# Patient Record
Sex: FEMALE | HISPANIC OR LATINO | Employment: PART TIME | URBAN - METROPOLITAN AREA
[De-identification: names, ages, dates, MRNs, and addresses within clinical notes are randomized per-mention and may not be internally consistent; named-entity substitution may affect disease eponyms.]

---

## 2019-04-27 ENCOUNTER — APPOINTMENT (OUTPATIENT)
Dept: ULTRASOUND IMAGING | Facility: CLINIC | Age: 39
End: 2019-04-27
Attending: PHYSICIAN ASSISTANT
Payer: COMMERCIAL

## 2019-04-27 ENCOUNTER — HOSPITAL ENCOUNTER (OUTPATIENT)
Facility: CLINIC | Age: 39
Setting detail: OBSERVATION
Discharge: HOME | End: 2019-04-28
Attending: NURSE PRACTITIONER | Admitting: INTERNAL MEDICINE
Payer: COMMERCIAL

## 2019-04-27 DIAGNOSIS — R11.14 BILIOUS VOMITING WITH NAUSEA: ICD-10-CM

## 2019-04-27 DIAGNOSIS — K80.13 CALCULUS OF GALLBLADDER WITH ACUTE ON CHRONIC CHOLECYSTITIS WITH OBSTRUCTION: Primary | ICD-10-CM

## 2019-04-27 DIAGNOSIS — K80.71: ICD-10-CM

## 2019-04-27 LAB
ALBUMIN SERPL-MCNC: 3.9 G/DL (ref 3.4–5)
ALP SERPL-CCNC: 96 U/L (ref 40–150)
ALT SERPL W P-5'-P-CCNC: 15 U/L (ref 0–50)
ANION GAP SERPL CALCULATED.3IONS-SCNC: 6 MMOL/L (ref 3–14)
AST SERPL W P-5'-P-CCNC: 10 U/L (ref 0–45)
B-HCG FREE SERPL-ACNC: <5 IU/L
BASOPHILS # BLD AUTO: 0.1 10E9/L (ref 0–0.2)
BASOPHILS NFR BLD AUTO: 0.8 %
BILIRUB SERPL-MCNC: 0.6 MG/DL (ref 0.2–1.3)
BUN SERPL-MCNC: 15 MG/DL (ref 7–30)
CALCIUM SERPL-MCNC: 9.2 MG/DL (ref 8.5–10.1)
CHLORIDE SERPL-SCNC: 106 MMOL/L (ref 94–109)
CO2 SERPL-SCNC: 27 MMOL/L (ref 20–32)
CREAT SERPL-MCNC: 0.51 MG/DL (ref 0.52–1.04)
DIFFERENTIAL METHOD BLD: ABNORMAL
EOSINOPHIL # BLD AUTO: 0.1 10E9/L (ref 0–0.7)
EOSINOPHIL NFR BLD AUTO: 1.6 %
ERYTHROCYTE [DISTWIDTH] IN BLOOD BY AUTOMATED COUNT: 14.9 % (ref 10–15)
GFR SERPL CREATININE-BSD FRML MDRD: >90 ML/MIN/{1.73_M2}
GLUCOSE SERPL-MCNC: 194 MG/DL (ref 70–99)
HCT VFR BLD AUTO: 35 % (ref 35–47)
HGB BLD-MCNC: 11.3 G/DL (ref 11.7–15.7)
IMM GRANULOCYTES # BLD: 0 10E9/L (ref 0–0.4)
IMM GRANULOCYTES NFR BLD: 0.3 %
LIPASE SERPL-CCNC: 209 U/L (ref 73–393)
LYMPHOCYTES # BLD AUTO: 1.4 10E9/L (ref 0.8–5.3)
LYMPHOCYTES NFR BLD AUTO: 18.6 %
MCH RBC QN AUTO: 22.8 PG (ref 26.5–33)
MCHC RBC AUTO-ENTMCNC: 32.3 G/DL (ref 31.5–36.5)
MCV RBC AUTO: 71 FL (ref 78–100)
MICROCYTES BLD QL SMEAR: PRESENT
MONOCYTES # BLD AUTO: 0.4 10E9/L (ref 0–1.3)
MONOCYTES NFR BLD AUTO: 5.8 %
NEUTROPHILS # BLD AUTO: 5.4 10E9/L (ref 1.6–8.3)
NEUTROPHILS NFR BLD AUTO: 72.9 %
NRBC # BLD AUTO: 0 10*3/UL
NRBC BLD AUTO-RTO: 0 /100
PLATELET # BLD AUTO: 114 10E9/L (ref 150–450)
PLATELET # BLD EST: ABNORMAL 10*3/UL
POTASSIUM SERPL-SCNC: 3.5 MMOL/L (ref 3.4–5.3)
PROT SERPL-MCNC: 8.2 G/DL (ref 6.8–8.8)
RBC # BLD AUTO: 4.95 10E12/L (ref 3.8–5.2)
SODIUM SERPL-SCNC: 139 MMOL/L (ref 133–144)
WBC # BLD AUTO: 7.4 10E9/L (ref 4–11)

## 2019-04-27 PROCEDURE — 96375 TX/PRO/DX INJ NEW DRUG ADDON: CPT

## 2019-04-27 PROCEDURE — 96376 TX/PRO/DX INJ SAME DRUG ADON: CPT | Mod: 59

## 2019-04-27 PROCEDURE — 83690 ASSAY OF LIPASE: CPT | Performed by: PHYSICIAN ASSISTANT

## 2019-04-27 PROCEDURE — 96374 THER/PROPH/DIAG INJ IV PUSH: CPT | Mod: 59

## 2019-04-27 PROCEDURE — 25000128 H RX IP 250 OP 636: Performed by: PHYSICIAN ASSISTANT

## 2019-04-27 PROCEDURE — 80053 COMPREHEN METABOLIC PANEL: CPT | Performed by: PHYSICIAN ASSISTANT

## 2019-04-27 PROCEDURE — 25000131 ZZH RX MED GY IP 250 OP 636 PS 637: Performed by: NURSE PRACTITIONER

## 2019-04-27 PROCEDURE — 85025 COMPLETE CBC W/AUTO DIFF WBC: CPT | Performed by: PHYSICIAN ASSISTANT

## 2019-04-27 PROCEDURE — 99285 EMERGENCY DEPT VISIT HI MDM: CPT | Mod: 25

## 2019-04-27 PROCEDURE — 84702 CHORIONIC GONADOTROPIN TEST: CPT

## 2019-04-27 PROCEDURE — 76705 ECHO EXAM OF ABDOMEN: CPT

## 2019-04-27 RX ORDER — ONDANSETRON 4 MG/1
4 TABLET, ORALLY DISINTEGRATING ORAL
Status: COMPLETED | OUTPATIENT
Start: 2019-04-27 | End: 2019-04-27

## 2019-04-27 RX ORDER — PROMETHAZINE HYDROCHLORIDE 25 MG/ML
25 INJECTION, SOLUTION INTRAMUSCULAR; INTRAVENOUS ONCE
Status: DISCONTINUED | OUTPATIENT
Start: 2019-04-27 | End: 2019-04-27 | Stop reason: CLARIF

## 2019-04-27 RX ORDER — ONDANSETRON 2 MG/ML
4 INJECTION INTRAMUSCULAR; INTRAVENOUS EVERY 30 MIN PRN
Status: DISCONTINUED | OUTPATIENT
Start: 2019-04-27 | End: 2019-04-28

## 2019-04-27 RX ORDER — HYDROMORPHONE HYDROCHLORIDE 1 MG/ML
0.5 INJECTION, SOLUTION INTRAMUSCULAR; INTRAVENOUS; SUBCUTANEOUS
Status: COMPLETED | OUTPATIENT
Start: 2019-04-27 | End: 2019-04-27

## 2019-04-27 RX ADMIN — Medication 0.5 MG: at 22:59

## 2019-04-27 RX ADMIN — ONDANSETRON 4 MG: 2 INJECTION INTRAMUSCULAR; INTRAVENOUS at 22:59

## 2019-04-27 RX ADMIN — ONDANSETRON 4 MG: 2 INJECTION INTRAMUSCULAR; INTRAVENOUS at 23:42

## 2019-04-27 RX ADMIN — Medication 0.5 MG: at 23:42

## 2019-04-27 RX ADMIN — ONDANSETRON 4 MG: 4 TABLET, ORALLY DISINTEGRATING ORAL at 22:05

## 2019-04-27 ASSESSMENT — ENCOUNTER SYMPTOMS
NAUSEA: 1
VOMITING: 1
CHILLS: 0
FEVER: 0
ABDOMINAL PAIN: 1
SHORTNESS OF BREATH: 0
APPETITE CHANGE: 1
COUGH: 0
BLOOD IN STOOL: 0
DIARRHEA: 0

## 2019-04-28 ENCOUNTER — APPOINTMENT (OUTPATIENT)
Dept: CT IMAGING | Facility: CLINIC | Age: 39
End: 2019-04-28
Attending: PHYSICIAN ASSISTANT
Payer: COMMERCIAL

## 2019-04-28 ENCOUNTER — ANESTHESIA EVENT (OUTPATIENT)
Dept: SURGERY | Facility: CLINIC | Age: 39
End: 2019-04-28
Payer: COMMERCIAL

## 2019-04-28 ENCOUNTER — ANESTHESIA (OUTPATIENT)
Dept: SURGERY | Facility: CLINIC | Age: 39
End: 2019-04-28
Payer: COMMERCIAL

## 2019-04-28 VITALS
SYSTOLIC BLOOD PRESSURE: 104 MMHG | HEART RATE: 53 BPM | DIASTOLIC BLOOD PRESSURE: 74 MMHG | RESPIRATION RATE: 14 BRPM | OXYGEN SATURATION: 100 % | TEMPERATURE: 98.3 F

## 2019-04-28 PROBLEM — K80.21 GALLSTONES WITH OBSTRUCTION OF GALLBLADDER: Status: ACTIVE | Noted: 2019-04-28

## 2019-04-28 LAB
GLUCOSE BLDC GLUCOMTR-MCNC: 159 MG/DL (ref 70–99)
GLUCOSE BLDC GLUCOMTR-MCNC: 200 MG/DL (ref 70–99)

## 2019-04-28 PROCEDURE — 74177 CT ABD & PELVIS W/CONTRAST: CPT

## 2019-04-28 PROCEDURE — 25000128 H RX IP 250 OP 636: Performed by: SURGERY

## 2019-04-28 PROCEDURE — 25800030 ZZH RX IP 258 OP 636: Performed by: ANESTHESIOLOGY

## 2019-04-28 PROCEDURE — 36000058 ZZH SURGERY LEVEL 3 EA 15 ADDTL MIN: Performed by: SURGERY

## 2019-04-28 PROCEDURE — 40000306 ZZH STATISTIC PRE PROC ASSESS II: Performed by: SURGERY

## 2019-04-28 PROCEDURE — 25000128 H RX IP 250 OP 636: Performed by: INTERNAL MEDICINE

## 2019-04-28 PROCEDURE — 27210794 ZZH OR GENERAL SUPPLY STERILE: Performed by: SURGERY

## 2019-04-28 PROCEDURE — 25000128 H RX IP 250 OP 636: Performed by: NURSE ANESTHETIST, CERTIFIED REGISTERED

## 2019-04-28 PROCEDURE — 99207 ZZC CDG-CODE CATEGORY CHANGED: CPT | Performed by: INTERNAL MEDICINE

## 2019-04-28 PROCEDURE — 88304 TISSUE EXAM BY PATHOLOGIST: CPT | Performed by: SURGERY

## 2019-04-28 PROCEDURE — G0378 HOSPITAL OBSERVATION PER HR: HCPCS

## 2019-04-28 PROCEDURE — 71000012 ZZH RECOVERY PHASE 1 LEVEL 1 FIRST HR: Performed by: SURGERY

## 2019-04-28 PROCEDURE — 36000056 ZZH SURGERY LEVEL 3 1ST 30 MIN: Performed by: SURGERY

## 2019-04-28 PROCEDURE — 47562 LAPAROSCOPIC CHOLECYSTECTOMY: CPT | Mod: AS | Performed by: PHYSICIAN ASSISTANT

## 2019-04-28 PROCEDURE — 71000013 ZZH RECOVERY PHASE 1 LEVEL 1 EA ADDTL HR: Performed by: SURGERY

## 2019-04-28 PROCEDURE — 99204 OFFICE O/P NEW MOD 45 MIN: CPT | Mod: 57 | Performed by: SURGERY

## 2019-04-28 PROCEDURE — 25000132 ZZH RX MED GY IP 250 OP 250 PS 637: Performed by: ANESTHESIOLOGY

## 2019-04-28 PROCEDURE — 99235 HOSP IP/OBS SAME DATE MOD 70: CPT | Performed by: INTERNAL MEDICINE

## 2019-04-28 PROCEDURE — 71000027 ZZH RECOVERY PHASE 2 EACH 15 MINS: Performed by: SURGERY

## 2019-04-28 PROCEDURE — 00000146 ZZHCL STATISTIC GLUCOSE BY METER IP

## 2019-04-28 PROCEDURE — 47562 LAPAROSCOPIC CHOLECYSTECTOMY: CPT | Performed by: SURGERY

## 2019-04-28 PROCEDURE — 96375 TX/PRO/DX INJ NEW DRUG ADDON: CPT

## 2019-04-28 PROCEDURE — 37000008 ZZH ANESTHESIA TECHNICAL FEE, 1ST 30 MIN: Performed by: SURGERY

## 2019-04-28 PROCEDURE — 37000009 ZZH ANESTHESIA TECHNICAL FEE, EACH ADDTL 15 MIN: Performed by: SURGERY

## 2019-04-28 PROCEDURE — 25800030 ZZH RX IP 258 OP 636: Performed by: NURSE PRACTITIONER

## 2019-04-28 PROCEDURE — 25000125 ZZHC RX 250: Performed by: NURSE ANESTHETIST, CERTIFIED REGISTERED

## 2019-04-28 PROCEDURE — 25000128 H RX IP 250 OP 636: Performed by: NURSE PRACTITIONER

## 2019-04-28 PROCEDURE — 25000132 ZZH RX MED GY IP 250 OP 250 PS 637: Performed by: INTERNAL MEDICINE

## 2019-04-28 RX ORDER — NICOTINE POLACRILEX 4 MG
15-30 LOZENGE BUCCAL
Status: DISCONTINUED | OUTPATIENT
Start: 2019-04-28 | End: 2019-04-28 | Stop reason: HOSPADM

## 2019-04-28 RX ORDER — FENTANYL CITRATE 50 UG/ML
25-50 INJECTION, SOLUTION INTRAMUSCULAR; INTRAVENOUS
Status: DISCONTINUED | OUTPATIENT
Start: 2019-04-28 | End: 2019-04-28 | Stop reason: HOSPADM

## 2019-04-28 RX ORDER — BUPIVACAINE HYDROCHLORIDE 2.5 MG/ML
INJECTION, SOLUTION EPIDURAL; INFILTRATION; INTRACAUDAL PRN
Status: DISCONTINUED | OUTPATIENT
Start: 2019-04-28 | End: 2019-04-28 | Stop reason: HOSPADM

## 2019-04-28 RX ORDER — MEPERIDINE HYDROCHLORIDE 50 MG/ML
12.5 INJECTION INTRAMUSCULAR; INTRAVENOUS; SUBCUTANEOUS
Status: DISCONTINUED | OUTPATIENT
Start: 2019-04-28 | End: 2019-04-28 | Stop reason: HOSPADM

## 2019-04-28 RX ORDER — ALBUTEROL SULFATE 0.83 MG/ML
2.5 SOLUTION RESPIRATORY (INHALATION) EVERY 4 HOURS PRN
Status: DISCONTINUED | OUTPATIENT
Start: 2019-04-28 | End: 2019-04-28 | Stop reason: HOSPADM

## 2019-04-28 RX ORDER — BISACODYL 10 MG
10 SUPPOSITORY, RECTAL RECTAL DAILY PRN
Status: DISCONTINUED | OUTPATIENT
Start: 2019-04-28 | End: 2019-04-28 | Stop reason: HOSPADM

## 2019-04-28 RX ORDER — HYDROMORPHONE HYDROCHLORIDE 1 MG/ML
.3-.5 INJECTION, SOLUTION INTRAMUSCULAR; INTRAVENOUS; SUBCUTANEOUS EVERY 10 MIN PRN
Status: DISCONTINUED | OUTPATIENT
Start: 2019-04-28 | End: 2019-04-28 | Stop reason: HOSPADM

## 2019-04-28 RX ORDER — FENTANYL CITRATE 50 UG/ML
INJECTION, SOLUTION INTRAMUSCULAR; INTRAVENOUS PRN
Status: DISCONTINUED | OUTPATIENT
Start: 2019-04-28 | End: 2019-04-28

## 2019-04-28 RX ORDER — HYDROCODONE BITARTRATE AND ACETAMINOPHEN 5; 325 MG/1; MG/1
1-2 TABLET ORAL EVERY 4 HOURS PRN
Qty: 20 TABLET | Refills: 0 | Status: SHIPPED | OUTPATIENT
Start: 2019-04-28 | End: 2019-05-09

## 2019-04-28 RX ORDER — SODIUM CHLORIDE, SODIUM LACTATE, POTASSIUM CHLORIDE, CALCIUM CHLORIDE 600; 310; 30; 20 MG/100ML; MG/100ML; MG/100ML; MG/100ML
INJECTION, SOLUTION INTRAVENOUS CONTINUOUS
Status: DISCONTINUED | OUTPATIENT
Start: 2019-04-28 | End: 2019-04-28 | Stop reason: HOSPADM

## 2019-04-28 RX ORDER — LIDOCAINE HYDROCHLORIDE 10 MG/ML
INJECTION, SOLUTION INFILTRATION; PERINEURAL PRN
Status: DISCONTINUED | OUTPATIENT
Start: 2019-04-28 | End: 2019-04-28

## 2019-04-28 RX ORDER — CEFAZOLIN SODIUM 2 G/100ML
2 INJECTION, SOLUTION INTRAVENOUS
Status: COMPLETED | OUTPATIENT
Start: 2019-04-28 | End: 2019-04-28

## 2019-04-28 RX ORDER — ACETAMINOPHEN 650 MG/1
650 SUPPOSITORY RECTAL EVERY 4 HOURS PRN
Status: DISCONTINUED | OUTPATIENT
Start: 2019-04-28 | End: 2019-04-28 | Stop reason: HOSPADM

## 2019-04-28 RX ORDER — ONDANSETRON 4 MG/1
4 TABLET, ORALLY DISINTEGRATING ORAL EVERY 30 MIN PRN
Status: DISCONTINUED | OUTPATIENT
Start: 2019-04-28 | End: 2019-04-28 | Stop reason: HOSPADM

## 2019-04-28 RX ORDER — LANOLIN ALCOHOL/MO/W.PET/CERES
3 CREAM (GRAM) TOPICAL
Status: DISCONTINUED | OUTPATIENT
Start: 2019-04-28 | End: 2019-04-28 | Stop reason: HOSPADM

## 2019-04-28 RX ORDER — ACETAMINOPHEN 325 MG/1
650 TABLET ORAL
Status: DISCONTINUED | OUTPATIENT
Start: 2019-04-28 | End: 2019-04-28 | Stop reason: HOSPADM

## 2019-04-28 RX ORDER — NALOXONE HYDROCHLORIDE 0.4 MG/ML
.1-.4 INJECTION, SOLUTION INTRAMUSCULAR; INTRAVENOUS; SUBCUTANEOUS
Status: DISCONTINUED | OUTPATIENT
Start: 2019-04-28 | End: 2019-04-28 | Stop reason: HOSPADM

## 2019-04-28 RX ORDER — BISACODYL 5 MG
5 TABLET, DELAYED RELEASE (ENTERIC COATED) ORAL DAILY PRN
Status: DISCONTINUED | OUTPATIENT
Start: 2019-04-28 | End: 2019-04-28 | Stop reason: HOSPADM

## 2019-04-28 RX ORDER — AMOXICILLIN 250 MG
2 CAPSULE ORAL 2 TIMES DAILY
Status: DISCONTINUED | OUTPATIENT
Start: 2019-04-28 | End: 2019-04-28 | Stop reason: HOSPADM

## 2019-04-28 RX ORDER — HYDRALAZINE HYDROCHLORIDE 20 MG/ML
2.5-5 INJECTION INTRAMUSCULAR; INTRAVENOUS EVERY 10 MIN PRN
Status: DISCONTINUED | OUTPATIENT
Start: 2019-04-28 | End: 2019-04-28 | Stop reason: HOSPADM

## 2019-04-28 RX ORDER — BISACODYL 5 MG
15 TABLET, DELAYED RELEASE (ENTERIC COATED) ORAL DAILY PRN
Status: DISCONTINUED | OUTPATIENT
Start: 2019-04-28 | End: 2019-04-28 | Stop reason: HOSPADM

## 2019-04-28 RX ORDER — ACETAMINOPHEN 325 MG/1
650 TABLET ORAL EVERY 4 HOURS PRN
Status: DISCONTINUED | OUTPATIENT
Start: 2019-04-28 | End: 2019-04-28 | Stop reason: HOSPADM

## 2019-04-28 RX ORDER — DEXTROSE MONOHYDRATE 25 G/50ML
25-50 INJECTION, SOLUTION INTRAVENOUS
Status: DISCONTINUED | OUTPATIENT
Start: 2019-04-28 | End: 2019-04-28 | Stop reason: HOSPADM

## 2019-04-28 RX ORDER — FENTANYL CITRATE 50 UG/ML
25-50 INJECTION, SOLUTION INTRAMUSCULAR; INTRAVENOUS EVERY 5 MIN PRN
Status: DISCONTINUED | OUTPATIENT
Start: 2019-04-28 | End: 2019-04-28 | Stop reason: HOSPADM

## 2019-04-28 RX ORDER — CEFAZOLIN SODIUM 1 G/3ML
1 INJECTION, POWDER, FOR SOLUTION INTRAMUSCULAR; INTRAVENOUS SEE ADMIN INSTRUCTIONS
Status: DISCONTINUED | OUTPATIENT
Start: 2019-04-28 | End: 2019-04-28 | Stop reason: HOSPADM

## 2019-04-28 RX ORDER — KETOROLAC TROMETHAMINE 30 MG/ML
30 INJECTION, SOLUTION INTRAMUSCULAR; INTRAVENOUS EVERY 6 HOURS PRN
Status: DISCONTINUED | OUTPATIENT
Start: 2019-04-28 | End: 2019-04-28 | Stop reason: HOSPADM

## 2019-04-28 RX ORDER — NEOSTIGMINE METHYLSULFATE 1 MG/ML
VIAL (ML) INJECTION PRN
Status: DISCONTINUED | OUTPATIENT
Start: 2019-04-28 | End: 2019-04-28

## 2019-04-28 RX ORDER — HYDROCODONE BITARTRATE AND ACETAMINOPHEN 5; 325 MG/1; MG/1
1 TABLET ORAL
Status: COMPLETED | OUTPATIENT
Start: 2019-04-28 | End: 2019-04-28

## 2019-04-28 RX ORDER — ONDANSETRON 2 MG/ML
INJECTION INTRAMUSCULAR; INTRAVENOUS PRN
Status: DISCONTINUED | OUTPATIENT
Start: 2019-04-28 | End: 2019-04-28

## 2019-04-28 RX ORDER — OXYCODONE HYDROCHLORIDE 5 MG/1
5 TABLET ORAL EVERY 4 HOURS PRN
Status: DISCONTINUED | OUTPATIENT
Start: 2019-04-28 | End: 2019-04-28 | Stop reason: HOSPADM

## 2019-04-28 RX ORDER — HYDROCODONE BITARTRATE AND ACETAMINOPHEN 5; 325 MG/1; MG/1
2 TABLET ORAL
Status: DISCONTINUED | OUTPATIENT
Start: 2019-04-28 | End: 2019-04-28 | Stop reason: HOSPADM

## 2019-04-28 RX ORDER — PROCHLORPERAZINE MALEATE 5 MG
10 TABLET ORAL EVERY 6 HOURS PRN
Status: DISCONTINUED | OUTPATIENT
Start: 2019-04-28 | End: 2019-04-28 | Stop reason: HOSPADM

## 2019-04-28 RX ORDER — ONDANSETRON 2 MG/ML
4 INJECTION INTRAMUSCULAR; INTRAVENOUS EVERY 6 HOURS PRN
Status: DISCONTINUED | OUTPATIENT
Start: 2019-04-28 | End: 2019-04-28 | Stop reason: HOSPADM

## 2019-04-28 RX ORDER — ONDANSETRON 4 MG/1
4 TABLET, ORALLY DISINTEGRATING ORAL EVERY 6 HOURS PRN
Status: DISCONTINUED | OUTPATIENT
Start: 2019-04-28 | End: 2019-04-28 | Stop reason: HOSPADM

## 2019-04-28 RX ORDER — IOPAMIDOL 755 MG/ML
500 INJECTION, SOLUTION INTRAVASCULAR ONCE
Status: COMPLETED | OUTPATIENT
Start: 2019-04-28 | End: 2019-04-28

## 2019-04-28 RX ORDER — BISACODYL 5 MG
10 TABLET, DELAYED RELEASE (ENTERIC COATED) ORAL DAILY PRN
Status: DISCONTINUED | OUTPATIENT
Start: 2019-04-28 | End: 2019-04-28 | Stop reason: HOSPADM

## 2019-04-28 RX ORDER — GLYCOPYRROLATE 0.2 MG/ML
INJECTION, SOLUTION INTRAMUSCULAR; INTRAVENOUS PRN
Status: DISCONTINUED | OUTPATIENT
Start: 2019-04-28 | End: 2019-04-28

## 2019-04-28 RX ORDER — PROCHLORPERAZINE 25 MG
25 SUPPOSITORY, RECTAL RECTAL EVERY 12 HOURS PRN
Status: DISCONTINUED | OUTPATIENT
Start: 2019-04-28 | End: 2019-04-28 | Stop reason: HOSPADM

## 2019-04-28 RX ORDER — METOPROLOL TARTRATE 1 MG/ML
1-2 INJECTION, SOLUTION INTRAVENOUS EVERY 5 MIN PRN
Status: DISCONTINUED | OUTPATIENT
Start: 2019-04-28 | End: 2019-04-28 | Stop reason: HOSPADM

## 2019-04-28 RX ORDER — PROPOFOL 10 MG/ML
INJECTION, EMULSION INTRAVENOUS PRN
Status: DISCONTINUED | OUTPATIENT
Start: 2019-04-28 | End: 2019-04-28

## 2019-04-28 RX ORDER — AMOXICILLIN 250 MG
1 CAPSULE ORAL 2 TIMES DAILY
Status: DISCONTINUED | OUTPATIENT
Start: 2019-04-28 | End: 2019-04-28 | Stop reason: HOSPADM

## 2019-04-28 RX ORDER — ONDANSETRON 2 MG/ML
4 INJECTION INTRAMUSCULAR; INTRAVENOUS EVERY 30 MIN PRN
Status: DISCONTINUED | OUTPATIENT
Start: 2019-04-28 | End: 2019-04-28 | Stop reason: HOSPADM

## 2019-04-28 RX ORDER — LIDOCAINE 40 MG/G
CREAM TOPICAL
Status: DISCONTINUED | OUTPATIENT
Start: 2019-04-28 | End: 2019-04-28 | Stop reason: HOSPADM

## 2019-04-28 RX ADMIN — FENTANYL CITRATE 100 MCG: 50 INJECTION, SOLUTION INTRAMUSCULAR; INTRAVENOUS at 10:51

## 2019-04-28 RX ADMIN — Medication 80 MG: at 10:51

## 2019-04-28 RX ADMIN — HYDROCODONE BITARTRATE AND ACETAMINOPHEN 1 TABLET: 5; 325 TABLET ORAL at 13:09

## 2019-04-28 RX ADMIN — FENTANYL CITRATE 150 MCG: 50 INJECTION, SOLUTION INTRAMUSCULAR; INTRAVENOUS at 10:54

## 2019-04-28 RX ADMIN — GLYCOPYRROLATE 0.6 MG: 0.2 INJECTION, SOLUTION INTRAMUSCULAR; INTRAVENOUS at 11:32

## 2019-04-28 RX ADMIN — IOPAMIDOL 86 ML: 755 INJECTION, SOLUTION INTRAVENOUS at 00:16

## 2019-04-28 RX ADMIN — CEFAZOLIN SODIUM 2 G: 2 INJECTION, SOLUTION INTRAVENOUS at 10:46

## 2019-04-28 RX ADMIN — Medication 4 MG: at 11:32

## 2019-04-28 RX ADMIN — PROPOFOL 150 MG: 10 INJECTION, EMULSION INTRAVENOUS at 10:51

## 2019-04-28 RX ADMIN — PROPOFOL 50 MG: 10 INJECTION, EMULSION INTRAVENOUS at 11:23

## 2019-04-28 RX ADMIN — SODIUM CHLORIDE 62 ML: 9 INJECTION, SOLUTION INTRAVENOUS at 00:16

## 2019-04-28 RX ADMIN — INSULIN HUMAN 2 UNITS: 100 INJECTION, SOLUTION PARENTERAL at 10:14

## 2019-04-28 RX ADMIN — SODIUM CHLORIDE, POTASSIUM CHLORIDE, SODIUM LACTATE AND CALCIUM CHLORIDE: 600; 310; 30; 20 INJECTION, SOLUTION INTRAVENOUS at 12:19

## 2019-04-28 RX ADMIN — ONDANSETRON 4 MG: 2 INJECTION INTRAMUSCULAR; INTRAVENOUS at 11:06

## 2019-04-28 RX ADMIN — HYDROMORPHONE HYDROCHLORIDE 1 MG: 1 INJECTION, SOLUTION INTRAMUSCULAR; INTRAVENOUS; SUBCUTANEOUS at 11:33

## 2019-04-28 RX ADMIN — SODIUM CHLORIDE, POTASSIUM CHLORIDE, SODIUM LACTATE AND CALCIUM CHLORIDE: 600; 310; 30; 20 INJECTION, SOLUTION INTRAVENOUS at 10:46

## 2019-04-28 RX ADMIN — LIDOCAINE HYDROCHLORIDE 50 MG: 10 INJECTION, SOLUTION INFILTRATION; PERINEURAL at 10:51

## 2019-04-28 RX ADMIN — PROCHLORPERAZINE EDISYLATE 10 MG: 5 INJECTION INTRAMUSCULAR; INTRAVENOUS at 01:46

## 2019-04-28 RX ADMIN — ROCURONIUM BROMIDE 30 MG: 10 INJECTION INTRAVENOUS at 10:54

## 2019-04-28 RX ADMIN — OXYCODONE HYDROCHLORIDE 5 MG: 5 TABLET ORAL at 06:52

## 2019-04-28 RX ADMIN — PROMETHAZINE HYDROCHLORIDE 25 MG: 25 INJECTION INTRAMUSCULAR; INTRAVENOUS at 00:06

## 2019-04-28 SDOH — HEALTH STABILITY: MENTAL HEALTH: HOW OFTEN DO YOU HAVE A DRINK CONTAINING ALCOHOL?: 2-3 TIMES A WEEK

## 2019-04-28 SDOH — HEALTH STABILITY: MENTAL HEALTH: HOW MANY STANDARD DRINKS CONTAINING ALCOHOL DO YOU HAVE ON A TYPICAL DAY?: 1 OR 2

## 2019-04-28 NOTE — ANESTHESIA CARE TRANSFER NOTE
Patient: Jacqueline Cifuentes    Procedure(s):  CHOLECYSTECTOMY, LAPAROSCOPIC    Diagnosis: update  Diagnosis Additional Information: No value filed.    Anesthesia Type:   General, ETT     Note:    Patient transferred to:PACU  Comments: Pt spont resps oral airway suctioned ETT removed to PACU VSS report to RNHandoff Report: Identifed the Patient, Identified the Reponsible Provider, Reviewed the pertinent medical history, Discussed the surgical course, Reviewed Intra-OP anesthesia mangement and issues during anesthesia, Set expectations for post-procedure period and Allowed opportunity for questions and acknowledgement of understanding      Vitals: (Last set prior to Anesthesia Care Transfer)    CRNA VITALS  4/28/2019 1114 - 4/28/2019 1148      4/28/2019             Pulse:  95    SpO2:  100 %                Electronically Signed By: NICK Sparrow CRNA  April 28, 2019  11:48 AM

## 2019-04-28 NOTE — CONSULTS
SURGICAL CONSULTATION   REQUESTING PHYSICIAN:  Dr Roxanne Lopez   HISTORY OF PRESENT ILLNESS:  I was asked by Dr. Lopez to see Jacqueline Cifuentes who is a 38 year-old female with a 16 hours history of abdominal pain which is located in the RUQ.    The pain does radiate to her back.  It does wake her from sleep.  It is associated with  anorexia, nausea, vomiting and chills. She does not have a history of fatty food intolerance.   She denies a history of jaundice or prior episodes of pancreatitis. Prior abdominal surgery includes tubal ligation.   PAST MEDICAL HISTORY:  has a past medical history of Cholelithiasis and Type 2 diabetes mellitus (H).  Smoking History:  has never smoked.    PAST SURGICAL HISTORY:    Past Surgical History:   Procedure Laterality Date     TUBAL LIGATION  2013     MEDICATIONS:   No current facility-administered medications on file prior to encounter.   No current outpatient medications on file prior to encounter.                                  ceFAZolin  1 g Intravenous See Admin Instructions     ceFAZolin  2 g Intravenous Pre-Op/Pre-procedure x 1 dose     [Auto Hold] senna-docusate  1 tablet Oral BID    Or     [Auto Hold] senna-docusate  2 tablet Oral BID     sodium chloride (PF)  3 mL Intracatheter Q8H     FAMILY HISTORY:  Breast cancer, diabetes and heart disease.  ALLERGIES: This patient is allergic to is allergic to latex.   REVIEW OF SYSTEMS: Negative except the HPI.   PHYSICAL EXAMINATION:   GENERAL: Pleasant, well-developed, well-nourished female, not ill-appearing, tired.   /78   Pulse 64   Temp 98.7  F (37.1  C) (Temporal)   Resp 16   SpO2 100%   HEENT: Normocephalic, atraumatic. No scleral icterus.   NECK/LYMPH: Supple.  No adenopathy.   LUNGS: Respirations unlabored.   CARDIOVASCULAR: Regular rhythm and rate, no murmurs.  ABDOMEN:  Abdomen is scaphoid. Tenderness, marked and involuntary guarding and  RUQ.  The gallbladder is palpable and tender. hypoactive bowel sounds.  No  hernia or scars noted.   EXTREMITIES: Without edema or deformity.  NEUROLOGIC: Alert and oriented, moves all extremities with good strength. Speech clear.   LABORATORY DATA: WBC- WBC   Date Value Ref Range Status   04/27/2019 7.4 4.0 - 11.0 10e9/L Final   , HgB- Hemoglobin   Date Value Ref Range Status   04/27/2019 11.3 (L) 11.7 - 15.7 g/dL Final     Liver function studies: Total Bilirubin 0.6, Alkaline Phosphatase 96, ALT 15, AST 10, Lipase 209.    IMAGING:  All imaging personally reviewed  All imaging studies reviewed by me.  CT scan of the abdomen:   Gallbladder: Large stone in the gallbladder neck  CT scan interpreted by radiologist     CT scan of the pelvis:   No masses, fluid collections, abcesses or significant abnormalities  CT scan interpreted by radiologist     Gallbladder ultrasound:   Common bile duct measures normal  Gallstones seen  Stone in the gallbladder neck observed       ASSESSMENT AND PLAN: Jacqueline Cifuentes  has Acute Cholecystitis..  I am recommending her for laparoscopic cholecystectomy.  Intraoperative cholangiograms are not indicated due to normal CBD and LFTs.   I discussed the risks of the procedure including incisional related complications like hernia and infection as well as the small chance for post-cholecystectomy diarrhea, or the need to convert to an open cholecystectomy.  We also discussed rare complications such as bile leak, bowel or bile duct injury.  Jacqueline is interested in proceeding with surgery in the next few hours.  ARA COTO MD     Please route or send letter to:  Primary Care Provider (PCP) and Referring Provider

## 2019-04-28 NOTE — H&P
History and Physical     Jacqueline Cifuentes MRN# [id number]   YOB: 1980 Age: 38 year old      Date of Admission:  4/27/2019    Primary care provider: Christine, Sheryl Lester          Assessment and Plan:   Summary of Stay: Jacqueline Cifuentes is a 38 year old female with a history of T2 dm, gallstones admitted on 4/27/2019 with RUQ abdominal pain 2/2 large gallstone stuck in the neck of the GB  Problem List:   1. Gallstone with GB obstruction:  Admit, keep NPO x meds/ice chips, pain and nausea control and surgical evaluation.  Will likely need surgery at some point in the near future.   2. T2dm:  Per care everywhere looks to be on just metformin.  Hold for now in light of GI process, If starts to trend over 300 will need ISS  DVT Prophylaxis: Ambulate every shift  Code Status: Full Code    Discussed with  and dtr, dtr provides interpretation               Chief Complaint:   Abdominal pain        History of Present Illness:    Jacqueline Cifuentes is a 38 year old Welsh speaking female with a history of T2 dm, gallstones by prior CT scanning who presents with abdominal pain.    She presented to the ER with complaints of n/v/abdominal pain that began abruptly at 5 pm 4/27/19.  She tried tums but had no relief.  No fevers or chills.  Ate lunch at a chinese buffet around noon today.     In the ER VS notable for hypertension in the setting of uncontrolled pain, she is Afeb, all other VSS.  Labs normal CMP x glucose is 200, lipase is wnl, HCG negative for pregnancy, CBC with normal WBC, mild anemia and thrombocytopenia.    AUS:  + 2.5 cm gallstone in the neck of the GB, CT A/P with same    She's been given 1 mg of hydromorphone and 12 mg of ondansetron in the ER but remains nauseated, she's mildly sedated as well    I am asked to admit her to the obs unit for pain control and surgical consultation       The history is obtained in discussion with the ER provider  Sagar Plummer PA-C as well as the patient with  excellent reliability          Past Medical History:     Past Medical History:   Diagnosis Date     Cholelithiasis      Type 2 diabetes mellitus (H)              Past Surgical History:     Past Surgical History:   Procedure Laterality Date     TUBAL LIGATION  2013             Social History:     Social History     Tobacco Use     Smoking status: Not on file     Smokeless tobacco: Never Used   Substance Use Topics     Alcohol use: Yes     Frequency: 2-3 times a week     Drinks per session: 1 or 2    with dtr          Family History:     Family History   Problem Relation Age of Onset     Breast Cancer Mother      Coronary Artery Disease Mother      Diabetes Father      Diabetes Sister             Allergies:     Allergies   Allergen Reactions     Latex              Medications:   Per care everywhere    Metformin 500 mg bid    Await formal med rec            Review of Systems:   A Comprehensive greater than 10 system review of systems was carried out.  Pertinent positives and negatives are noted above.  Otherwise negative for contributory information.           Physical Exam:   Blood pressure (!) 155/101, pulse 60, temperature 97.5  F (36.4  C), temperature source Temporal, resp. rate 26, SpO2 94 %.  Exam:    General:  Sedated, laying in be in fetal position.  NAD  HEENT:  Head nc/at looks stated age, neck is supple  Lungs: cta b nl effort   CV:  RRR no m/r/g no le edema  Abd:  Soft but has guarding in ruq and epigastric region   Neuro:  Cn 2-12 chou  Alert and oriented affect appropriate   Skin:  W/d no c/c               Data:          Lab Results   Component Value Date     04/27/2019    Lab Results   Component Value Date    CHLORIDE 106 04/27/2019    Lab Results   Component Value Date    BUN 15 04/27/2019      Lab Results   Component Value Date    POTASSIUM 3.5 04/27/2019    Lab Results   Component Value Date    CO2 27 04/27/2019    Lab Results   Component Value Date    CR 0.51 04/27/2019        Lab  Results   Component Value Date    WBC 7.4 04/27/2019    HGB 11.3 (L) 04/27/2019    HCT 35.0 04/27/2019    MCV 71 (L) 04/27/2019     (L) 04/27/2019     Lab Results   Component Value Date     (H) 04/27/2019     Lab Results   Component Value Date    AST 10 04/27/2019    ALT 15 04/27/2019    ALKPHOS 96 04/27/2019    BILITOTAL 0.6 04/27/2019            Imaging:     Recent Results (from the past 24 hour(s))   US Abdomen Limited (RUQ)    Narrative    US ABDOMEN LIMITED 4/27/2019 11:34 PM    HISTORY: Right upper quadrant tenderness.    COMPARISON: None.    FINDINGS:    Gallbladder: There is a 2.5 cm nonmobile stone in the gallbladder  neck. Gallbladder wall thickness is normal. No pericholecystic fluid.    Bile ducts:  CHD is normal diameter, measuring 0.3 cm.  No  intrahepatic biliary dilatation.    Liver:  Normal size and echogenicity.    Pancreas:  Normal where visualized.    Right kidney:  Normal size and echogenicity.    Proximal aorta and IVC are unremarkable.      Impression    IMPRESSION:  Nonmobile stone in the gallbladder neck. No specific  evidence of cholecystitis.       LETICIA PROCTOR MD   CT Abdomen Pelvis w Contrast    Narrative    CT ABDOMEN PELVIS W CONTRAST 4/28/2019 12:20 AM    HISTORY: Diffuse abdominal pain. Nausea and vomiting.    COMPARISON: None.    TECHNIQUE:  Abdomen and pelvis CT was performed following intravenous  administration of 86mL Isovue-370.  Radiation dose for this scan was  reduced using automated exposure control, adjustment of the mA and/or  kV according to patient size, or iterative reconstruction technique.    FINDINGS: Large 2.7 cm stone in the gallbladder neck. Gallbladder is  distended. No pericholecystic fluid. Liver, spleen, pancreas, adrenal  glands and kidneys appear normal.    Normal appendix. Normal caliber bowel. No free air. No free or  loculated fluid collection.    The uterus and adnexa appear normal for age. Urinary bladder is  partially distended with  normal wall thickness. No free fluid in the  pelvis.      Impression    IMPRESSION: Large gallstone in the gallbladder neck. Abdomen and  pelvis otherwise appear normal.    LETICIA PROCTOR MD

## 2019-04-28 NOTE — PLAN OF CARE
ROOM # 207  Living Situation (if not independent, order SW consult): Independent at home with   Facility name:  : [facility] ()   [phone]    Activity level at baseline: Ind  Activity level on admit: SBA for safety      Patient registered to observation; given Patient Bill of Rights; given the opportunity to ask questions about observation status and their plan of care.  Patient has been oriented to the observation room, bathroom and call light is in place.    Discussed discharge goals and expectations with patient/family.

## 2019-04-28 NOTE — ED NOTES
[facility]  ED Nurse Handoff Report    Jacquelien Cifuentes is a 38 year old female   ED Chief complaint: Abdominal Pain  . ED Diagnosis:   Final diagnoses:   Gallbladder/common duct stone, without infection, with obstruction     Allergies:   Allergies   Allergen Reactions     Latex        Code Status: Full Code  Activity level - Baseline/Home:  Independent. Activity Level - Current:   Independent. Lift room needed: No. Bariatric: No   Needed: Yes   Isolation: No. Infection: Not Applicable.     Vital Signs:   Vitals:    04/27/19 2345 04/28/19 0000 04/28/19 0015 04/28/19 0030   BP:       Pulse:       Resp:       Temp:       TempSrc:       SpO2: 98% 95% 98% 94%       Cardiac Rhythm:  ,      Pain level: 0-10 Pain Scale: 7  Patient confused: No. Patient Falls Risk: No.   Elimination Status: Unable to void   Patient Report - Initial Complaint: Patient comes in for evaluation of epigastric pain for a week which has become worse today, has also had nausea and vomiting. Patient states she has had intermittent fevers at home. Pain also radiates into back. ABCs intact.. Focused Assessment: Gastrointestinal - GI WDL: -WDL except; all  Abdominal Palpation: LUQ; RUQ  Nausea/Vomiting Signs/Symptoms: nausea continuous; emesis; yellow  Last Bowel Movement: 04/27/19  GI Signs/Symptoms: abdominal discomfort; abdominal pain; dry-heaving; indigestion; nausea   Gastrointestinal - LUQ Abdominal Palpation: tender  RUQ Abdominal Palpation: tender    Tests Performed: Lab, CT, US, UA needed. Abnormal Results:   Labs Ordered and Resulted from Time of ED Arrival Up to the Time of Departure from the ED   CBC WITH PLATELETS DIFFERENTIAL - Abnormal; Notable for the following components:       Result Value    Hemoglobin 11.3 (*)     MCV 71 (*)     MCH 22.8 (*)     Platelet Count 114 (*)     All other components within normal limits   COMPREHENSIVE METABOLIC PANEL - Abnormal; Notable for the following components:    Glucose 194  (*)     Creatinine 0.51 (*)     All other components within normal limits   LIPASE   URINE MACROSCOPIC WITH REFLEX TO MICRO   ISTAT HCG QUANTITATIVE PREGNANCY NURSING POCT   FREE WATER   ISTAT HCG QUANTITATIVE PREGNANCY POCT     CT Abdomen Pelvis w Contrast   Final Result   IMPRESSION: Large gallstone in the gallbladder neck. Abdomen and   pelvis otherwise appear normal.      LETICIA PROCTOR MD      US Abdomen Limited (RUQ)   Final Result   IMPRESSION:  Nonmobile stone in the gallbladder neck. No specific   evidence of cholecystitis.          LETICIA PROCTOR MD      .   Treatments provided: See MAR  Family Comments: Daughter and  present.  Daughter speaks some English.  OBS brochure/video discussed/provided to patient:  Yes  ED Medications:   Medications   ondansetron (ZOFRAN) injection 4 mg (4 mg Intravenous Given 4/27/19 2342)   ondansetron (ZOFRAN-ODT) ODT tab 4 mg (4 mg Oral Given 4/27/19 2205)   HYDROmorphone (PF) (DILAUDID) injection 0.5 mg (0.5 mg Intravenous Given 4/27/19 2259)   HYDROmorphone (PF) (DILAUDID) injection 0.5 mg (0.5 mg Intravenous Given 4/27/19 2342)   promethazine (PHENERGAN) 25 mg in sodium chloride 0.9 % 50 mL intermittent infusion (25 mg Intravenous Given 4/28/19 0006)   0.9% sodium chloride BOLUS (0 mLs Intravenous Stopped 4/28/19 0017)   iopamidol (ISOVUE-370) solution 500 mL (86 mLs Intravenous Given 4/28/19 0016)     Drips infusing:  No  For the majority of the shift, the patient's behavior Green. Interventions performed were none.     Severe Sepsis OR Septic Shock Diagnosis Present: No      ED Nurse Name/Phone Number: Yayo CONTRERASKonstantin Barreto,   12:40 AM  RECEIVING UNIT ED HANDOFF REVIEW    Above ED Nurse Handoff Report was reviewed: Yes  Reviewed by: Dagoberto Morejon on April 28, 2019 at 12:55 AM

## 2019-04-28 NOTE — ED TRIAGE NOTES
Patient comes in for evaluation of epigastric pain for a week which has become worse today, has also had nausea and vomiting. Patient states she has had intermittent fevers at home. Pain also radiates into back. ABCs intact.

## 2019-04-28 NOTE — ANESTHESIA POSTPROCEDURE EVALUATION
Patient: Jacqueline Cifuentes    Procedure(s):  CHOLECYSTECTOMY, LAPAROSCOPIC    Diagnosis:update  Diagnosis Additional Information: PREOPERATIVE DIAGNOSIS: Acute Cholecystitis.   POSTOPERATIVE DIAGNOSIS:Acute Cholecystitis. and Chronic Cholecysititis  PROCEDURE: Laparoscopic cholecystectomy.        Anesthesia Type:  General, ETT    Note:  Anesthesia Post Evaluation    Patient location during evaluation: PACU  Patient participation: Able to fully participate in evaluation  Level of consciousness: awake  Pain management: adequate  Airway patency: patent  Cardiovascular status: acceptable  Respiratory status: acceptable  Hydration status: acceptable  PONV: controlled     Anesthetic complications: None          Last vitals:  Vitals:    04/28/19 1300 04/28/19 1315 04/28/19 1400   BP: 135/76 140/76 104/74   Pulse:  53    Resp: 14     Temp:      SpO2: 100% 100% 100%         Electronically Signed By: Cj Chinchilla MD  April 28, 2019  2:37 PM

## 2019-04-28 NOTE — ED PROVIDER NOTES
Emergency Department Attending Supervision Note  4/27/2019  10:32 PM      I evaluated this patient in conjunction with Sagar FERNANDEZ    Briefly, the patient presented with uncontrolled nausea and vomiting and abdominal discomfort.  Symptoms started approximately 5:00 after dinner.  She has had no fevers.  This was sudden onset.  There is been no blood in vomit.  She has a history of tubal ligation.  No diarrhea.  No other family members are ill.  No other concerns or complaints at this time.      On my exam:  General: Alert, Moderate discomfort, well kept, retching on exam  Eyes: PERRL, conjunctivae pink no scleral icterus or conjunctival injection  ENT:   Moist mucus membranes, posterior oropharynx clear without erythema or exudates, No lymphadenopathy, Normal voice  Resp:  Lungs clear to auscultation bilaterally, no crackles/rubs/wheezes. Good air movement  CV:  Normal rate and rhythm, no murmurs/rubs/gallops  GI:  Abdomen soft and non-distended.  Normoactive BS.  Mid epigastric/right upper quadrant tenderness, also right lower quadrant tenderness, No guarding or rebound, No masses  Skin:  Warm, dry.  No rashes or petechiae  Musculoskeletal: No peripheral edema or calf tenderness, Normal gross ROM   Neuro: Alert and oriented to person/place/time, normal sensation  Psychiatric: Normal affect, cooperative, good eye contact    Recent Results (from the past 24 hour(s))   US Abdomen Limited (RUQ)    Narrative    US ABDOMEN LIMITED 4/27/2019 11:34 PM    HISTORY: Right upper quadrant tenderness.    COMPARISON: None.    FINDINGS:    Gallbladder: There is a 2.5 cm nonmobile stone in the gallbladder  neck. Gallbladder wall thickness is normal. No pericholecystic fluid.    Bile ducts:  CHD is normal diameter, measuring 0.3 cm.  No  intrahepatic biliary dilatation.    Liver:  Normal size and echogenicity.    Pancreas:  Normal where visualized.    Right kidney:  Normal size and echogenicity.    Proximal aorta and IVC are  unremarkable.      Impression    IMPRESSION:  Nonmobile stone in the gallbladder neck. No specific  evidence of cholecystitis.       LETICIA PROCTOR MD   CT Abdomen Pelvis w Contrast    Narrative    CT ABDOMEN PELVIS W CONTRAST 4/28/2019 12:20 AM    HISTORY: Diffuse abdominal pain. Nausea and vomiting.    COMPARISON: None.    TECHNIQUE:  Abdomen and pelvis CT was performed following intravenous  administration of 86mL Isovue-370.  Radiation dose for this scan was  reduced using automated exposure control, adjustment of the mA and/or  kV according to patient size, or iterative reconstruction technique.    FINDINGS: Large 2.7 cm stone in the gallbladder neck. Gallbladder is  distended. No pericholecystic fluid. Liver, spleen, pancreas, adrenal  glands and kidneys appear normal.    Normal appendix. Normal caliber bowel. No free air. No free or  loculated fluid collection.    The uterus and adnexa appear normal for age. Urinary bladder is  partially distended with normal wall thickness. No free fluid in the  pelvis.      Impression    IMPRESSION: Large gallstone in the gallbladder neck. Abdomen and  pelvis otherwise appear normal.    LETICIA PROCTOR MD     Recent Results (from the past 8 hour(s))   CBC with platelets differential    Collection Time: 04/27/19 10:27 PM   Result Value Ref Range    WBC 7.4 4.0 - 11.0 10e9/L    RBC Count 4.95 3.8 - 5.2 10e12/L    Hemoglobin 11.3 (L) 11.7 - 15.7 g/dL    Hematocrit 35.0 35.0 - 47.0 %    MCV 71 (L) 78 - 100 fl    MCH 22.8 (L) 26.5 - 33.0 pg    MCHC 32.3 31.5 - 36.5 g/dL    RDW 14.9 10.0 - 15.0 %    Platelet Count 114 (L) 150 - 450 10e9/L    Diff Method Automated Method     % Neutrophils 72.9 %    % Lymphocytes 18.6 %    % Monocytes 5.8 %    % Eosinophils 1.6 %    % Basophils 0.8 %    % Immature Granulocytes 0.3 %    Nucleated RBCs 0 0 /100    Absolute Neutrophil 5.4 1.6 - 8.3 10e9/L    Absolute Lymphocytes 1.4 0.8 - 5.3 10e9/L    Absolute Monocytes 0.4 0.0 - 1.3 10e9/L    Absolute  Eosinophils 0.1 0.0 - 0.7 10e9/L    Absolute Basophils 0.1 0.0 - 0.2 10e9/L    Abs Immature Granulocytes 0.0 0 - 0.4 10e9/L    Absolute Nucleated RBC 0.0     Microcytes Present     Platelet Estimate       Automated count confirmed.  Platelet morphology is normal.   Comprehensive metabolic panel    Collection Time: 04/27/19 10:27 PM   Result Value Ref Range    Sodium 139 133 - 144 mmol/L    Potassium 3.5 3.4 - 5.3 mmol/L    Chloride 106 94 - 109 mmol/L    Carbon Dioxide 27 20 - 32 mmol/L    Anion Gap 6 3 - 14 mmol/L    Glucose 194 (H) 70 - 99 mg/dL    Urea Nitrogen 15 7 - 30 mg/dL    Creatinine 0.51 (L) 0.52 - 1.04 mg/dL    GFR Estimate >90 >60 mL/min/[1.73_m2]    GFR Estimate If Black >90 >60 mL/min/[1.73_m2]    Calcium 9.2 8.5 - 10.1 mg/dL    Bilirubin Total 0.6 0.2 - 1.3 mg/dL    Albumin 3.9 3.4 - 5.0 g/dL    Protein Total 8.2 6.8 - 8.8 g/dL    Alkaline Phosphatase 96 40 - 150 U/L    ALT 15 0 - 50 U/L    AST 10 0 - 45 U/L   Lipase    Collection Time: 04/27/19 10:27 PM   Result Value Ref Range    Lipase 209 73 - 393 U/L   ISTAT HCG Quantitative Pregnancy POCT    Collection Time: 04/27/19 10:36 PM   Result Value Ref Range    HCG Quantitative Serum <5.0 <5.0 IU/L     Medications   ondansetron (ZOFRAN) injection 4 mg (4 mg Intravenous Given 4/27/19 2342)   ondansetron (ZOFRAN-ODT) ODT tab 4 mg (4 mg Oral Given 4/27/19 2205)   HYDROmorphone (PF) (DILAUDID) injection 0.5 mg (0.5 mg Intravenous Given 4/27/19 2259)   HYDROmorphone (PF) (DILAUDID) injection 0.5 mg (0.5 mg Intravenous Given 4/27/19 2342)   promethazine (PHENERGAN) 25 mg in sodium chloride 0.9 % 50 mL intermittent infusion (25 mg Intravenous Given 4/28/19 0006)   0.9% sodium chloride BOLUS (0 mLs Intravenous Stopped 4/28/19 0017)   iopamidol (ISOVUE-370) solution 500 mL (86 mLs Intravenous Given 4/28/19 0016)     Admit to obs for symptoms management and likely surgical consult.    Diagnosis    ICD-10-CM    1. Gallbladder/common duct stone, without infection,  with obstruction K80.71          Dillon KoNP 4/27/2019 10:32 PM     Dillon Ceron, APRN CNP  04/28/19 0051

## 2019-04-28 NOTE — ED PROVIDER NOTES
History     Chief Complaint:  Abdominal Pain, Nausea, and Vomiting    The history is provided by the patient. The history is limited by a language barrier and the condition of the patient.      Jacqueline Cifuentes is a 38 year old female who presents to the ED with her family for evaluation of abdominal pain, nausea, and vomiting.  Patient reports onset of epigastric pain, nausea, and vomiting beginning suddenly around 1700.  She states that she has tried Tums without any change in her symptoms.  She denies any blood in her emesis.  She denies any diarrhea.  She notes that she ate fish at a [employer] restaurant buffet around noon today. She denies any ill contacts.    Allergies:  Latex     Medications:    The patient is not currently taking any prescribed medications.    Past Medical History:    Type II DM    Past Surgical History:    Tubal ligation    Family History:    No past pertinent family history.    Social History:  Unknown status of tobacco use.  Unknown status of alcohol use.  Unknown status of drug use.  Marital Status: Unknown     Review of Systems   Constitutional: Positive for appetite change. Negative for chills and fever.   Respiratory: Negative for cough and shortness of breath.    Cardiovascular: Negative for chest pain.   Gastrointestinal: Positive for abdominal pain, nausea and vomiting. Negative for blood in stool and diarrhea.   All other systems reviewed and are negative.      Physical Exam     Patient Vitals for the past 24 hrs:   BP Temp Temp src Pulse Resp SpO2   04/28/19 0030 -- -- -- -- -- 94 %   04/28/19 0015 -- -- -- -- -- 98 %   04/28/19 0000 -- -- -- -- -- 95 %   04/27/19 2345 -- -- -- -- -- 98 %   04/27/19 2330 -- -- -- -- -- 96 %   04/27/19 2153 (!) 155/101 97.5  F (36.4  C) Temporal 60 26 99 %     Physical Exam  Constitutional: Patient is writhing, vomiting intermittently.  Eyes: Pupils equally round and reactive  HENT: Head is normal in appearance.   Cardiovascular: Regular rate and  rhythm and without murmurs.  Respiratory: Normal respiratory effort, lungs are clear bilaterally.  GI: Tenderness to palpation of RUQ and epigastrium. Positive Pritchard's sign. No peritoneal signs.   Musculoskeletal: No asymmetry of the lower extremities.  Skin: Normal, without rash.  Neurologic: Alert and oriented x 3.   Psychiatric: Normal affect.  Nursing notes and vital signs reviewed.    Emergency Department Course   Imaging:  US Abdomen RUQ, limited:  Nonmobile stone in the gallbladder neck. No specific evidence of cholecystitis. As per radiology.     CT Abdomen/Pelvis with IV contrast:   Large gallstone in the gallbladder neck. Abdomen and pelvis otherwise appear normal. As per radiology.    Laboratory:  CBC: WBC: 7.4, HGB: 11.3 (L0, PLT: 114 (L)  CMP: Glucose 194 (H), Creatinine: 0.51 (L), o/w WNL     Lipase: 209  UA with Microscopic: Pending  ISTAT HCG Quantitative Pregnancy Nursing: <5.0    Interventions:  2205 Zofran 4 mg PO  2259 Zofran 4 mg IV  2259 Dilaudid 0.5 mg IV  2342 Zofran 4 mg IV  2342 Dilaudid 0.5 mg IV  0006 Phenergan 25 mg IB    Emergency Department Course:  Nursing notes and vitals reviewed.   2210 I performed an exam of the patient as documented above.     IV inserted. Medicine administered as documented above. Blood drawn. This was sent to the lab for further testing, results above.    The patient was sent for an abdomen US while in the emergency department, findings above.     2330 I rechecked the patient and discussed the results of her workup thus far.     0045  I consulted with Dr. Lopez of the hospitalist services. They are in agreement to accept the patient for admission.    0058 I rechecked the patient and discussed the results of her workup thus far.     Findings and plan explained to the Patient and spouse and daughter who consents to admission. Discussed the patient with Dr. Lopez, who will admit the patient to an observation bed for further monitoring, evaluation, and  treatment.    Impression & Plan    Medical Decision Making:  Jacqueline Cifuentes is a 38 year old female who presents to the ED for evaluation of abdominal pain, nausea, and vomiting.  Patient reports onset of epigastric pain, nausea, and vomiting beginning suddenly around 1700. She notes that she ate fish at a [employer] restaurant buffet around noon today.  See above for additional details.    On my exam, patient is writhing in pain, and intermittently vomiting.  Exam is remarkable for tenderness to palpation of the right upper quadrant and epigastrium.  She had a positive Pritchard's sign.  Exam was otherwise unremarkable.  Differential diagnosis was broad, including pancreatitis, cholecystitis, choledocholithiasis, peptic ulcer disease, biliary colic, hepatitis, atypical presentation of appendicitis.  Work-up included CBC, CMP, lipase, UA, i-STAT hCG.  It was noted after the fact that patient had a tubal ligation.  CBC was remarkable for anemia with a hemoglobin of 11.3 and platelet count of 114, although no leukocytosis. CMP was unremarkable. Lipase was WNL. UA was pending at the time of this dictation. Additionally, RUQ US was obtained. In light of the patient's persistent vomiting and inability to control her pain with multiple doses of dilaudid, an abdominal CT which was ordered prior to obtaining the results of the RUQ US. Both imaging studies revealed a large 2.7 cm stone in the gallbladder neck. No evidence of appendicitis or other concerning findings. Discussed all results with the patient via . Patient is agreeable to admission for pain control, nausea control, and surgical consult in the AM. Discussed the case with Dr. Lopez, who agreed to admit the patient to the observation unit. All questions were answered prior to the patient's admission. Patient was admitted in stable condition.     Diagnosis:    ICD-10-CM    1. Gallbladder/common duct stone, without infection, with obstruction K80.71    2. Bilious  vomiting with nausea R11.14      Disposition:  Admitted to Dr. Jessica Gil Disclosure:  I, Sofiya Powell, am serving as a scribe on 4/27/2019 at 10:06 PM to personally document services performed by Sagar Plummer PA-C, based on my observations and the provider's statements to me.     Sofiya Powell  4/27/2019   [facility] EMERGENCY DEPARTMENT       Sagar Plummer PA-C  04/28/19 0123

## 2019-04-28 NOTE — PLAN OF CARE
PRIMARY DIAGNOSIS: GALLSTONES WITH OBSTRUCTION  OUTPATIENT/OBSERVATION GOALS TO BE MET BEFORE DISCHARGE:  1. ADLs back to baseline: No    2. Activity and level of assistance: Up with standby assistance.    3. Pain status: Pain free. Pt denies pain, however upon palpation of abdomen the pt winced on RUQ     4. Return to near baseline physical activity: Yes, Pt is independent at home. SBA for safety     Discharge Planner Nurse   Safe discharge environment identified: Yes  Barriers to discharge: Yes, Medical clearance       Entered by: Dagoberto Morejon 04/28/2019 6:03 AM     Please review provider order for any additional goals.   Nurse to notify provider when observation goals have been met and patient is ready for discharge.  Temp: 97.7  F (36.5  C) Temp src: Axillary BP: 133/58 Pulse: 59   Resp: 16 SpO2: 100 % O2 Device: None (Room air)    Pt A&Ox4. LS clear across all fields. Bowel sounds present in all quadrants. Pt primary language is Yi. Interpretor requested from [id number]. Pt denies pain, however upon palpation of the abdom RUQ pt winced. Pt SBA for safety, however needed no assistance transferring from rney to bed. Pt has had two episodes of emesis during shift, Zofran given in ED, compazine given after @ 0130. Pt to see general surgery in AM.

## 2019-04-28 NOTE — ANESTHESIA PREPROCEDURE EVALUATION
Anesthesia Pre-Procedure Evaluation    Patient: Jacqueline Cifuentes   MRN: [id number] : 1980          Preoperative Diagnosis: update    Procedure(s):  CHOLECYSTECTOMY, LAPAROSCOPIC    Past Medical History:   Diagnosis Date     Cholelithiasis      Type 2 diabetes mellitus (H)      Past Surgical History:   Procedure Laterality Date     TUBAL LIGATION       Anesthesia Evaluation     . Pt has had prior anesthetic.            ROS/MED HX    ENT/Pulmonary:  - neg pulmonary ROS     Neurologic:       Cardiovascular:  - neg cardiovascular ROS       METS/Exercise Tolerance:     Hematologic:         Musculoskeletal:         GI/Hepatic:     (+) cholecystitis/cholelithiasis,       Renal/Genitourinary:         Endo:     (+) type II DM .      Psychiatric:         Infectious Disease:         Malignancy:         Other:                          Physical Exam      Airway   Mallampati: II  TM distance: >3 FB  Neck ROM: full    Dental     Cardiovascular   Rhythm and rate: regular and normal      Pulmonary    breath sounds clear to auscultation            Lab Results   Component Value Date    WBC 7.4 2019    HGB 11.3 (L) 2019    HCT 35.0 2019     (L) 2019     2019    POTASSIUM 3.5 2019    CHLORIDE 106 2019    CO2 27 2019    BUN 15 2019    CR 0.51 (L) 2019     (H) 2019    MANAV 9.2 2019    ALBUMIN 3.9 2019    PROTTOTAL 8.2 2019    ALT 15 2019    AST 10 2019    ALKPHOS 96 2019    BILITOTAL 0.6 2019    LIPASE 209 2019       Preop Vitals  BP Readings from Last 3 Encounters:   19 125/78    Pulse Readings from Last 3 Encounters:   19 64      Resp Readings from Last 3 Encounters:   19 16    SpO2 Readings from Last 3 Encounters:   19 100%      Temp Readings from Last 1 Encounters:   19 98.7  F (37.1  C) (Temporal)    Ht Readings from Last 1 Encounters:   No data found for Ht       Wt Readings from Last 1 Encounters:   No data found for Wt    There is no height or weight on file to calculate BMI.       Anesthesia Plan      History & Physical Review  History and physical reviewed and following examination; no interval change.    ASA Status:  2 .    NPO Status:  > 8 hours    Plan for General and ETT with Intravenous and Propofol induction. Maintenance will be Balanced.    PONV prophylaxis:  Ondansetron (or other 5HT-3)       Postoperative Care  Postoperative pain management:  IV analgesics, Oral pain medications and Multi-modal analgesia.      Consents  Anesthetic plan, risks, benefits and alternatives discussed with:  Patient..                 Cj Chinchilla MD                    .

## 2019-04-28 NOTE — DISCHARGE INSTRUCTIONS
HOME CARE FOLLOWING LAPAROSCOPIC CHOLECYSTECTOMY  FREDDY Bess, SOCO Ponce R. O Donnell, J. Shaheen    INCISIONAL CARE:  Replace the bandage over your incisions until all drainage stops, or if more comfortable to have in place.  If present, leave the steri-strips (white paper tapes) in place for 14 days after surgery.  If Dermabond (a type of skin glue) is present, leave in place until it wears/flakes off.     BATHING:  Avoid baths for 1 week after surgery.  Showers are okay.  You may wash your hair at any time.  Gently pat your incisions dry after bathing.    ACTIVITY:  Light Activity -- you may immediately be up and about as tolerated.  Driving -- you may drive when comfortable and off narcotic pain medications.  Light Work -- resume when comfortable off pain medications.  (If you can drive, you probably can work.)  Strenuous Work/Activity -- limit lifting to 20 pounds for 2 weeks.  Progressively increase with time.  Active Sports (running, biking, etc.) -- cautiously resume after 2 weeks.    DISCOMFORT:  Use pain medications as prescribed by your surgeon.  Take the pain medication with some food, when possible, to minimize side effects.  Intermittent use of ice packs at the incision sites may help during the first 48 hours.  Expect gradual improvement.  You may experience shoulder pain, which is due to the air placed within your abdomen during the procedure.  This is temporary and usually passes within 2 days.    DIET:  Drink plenty of fluids.  While taking pain medications, increase dietary fiber or add a fiber supplementation like Metamucil or Citrucel to help prevent constipation - a possible side effect of pain medications.  It is not uncommon to experience some bowel changes (loose stools or constipation) after surgery.  Your body has to adapt to you no longer having a gall bladder.  To help minimize this side effect, avoid fatty foods for the first week after surgery.  You may  then slowly increase the amount of fatty foods in your diet.      NAUSEA:  If nauseated from the anesthetic/pain meds; rest in bed, get up cautiously with assistance, and drink clear liquids (juice, tea, broth).    RETURN APPOINTMENT:  Schedule a follow-up visit 2-3 weeks post-op.  Office Phone:  [phone]     CONTACT US IF THE FOLLOWING DEVELOPS:   1. A fever that is above 101     2. If there is a large amount of drainage, bleeding, or swelling.   3. Severe pain that is not relieved by your prescription.   4. Drainage that is thick, cloudy, yellow, green or white.   5. Any other questions not answered by  Frequently Asked Questions  sheet.      FREQUENTLY ASKED QUESTIONS:    Q:  How should my incision look?    A:  Normally your incision will appear slightly swollen with light redness directly along the incision itself as it heals.  It may feel like a bump or ridge as the healing/scarring happens, and over time (3-4 months) this bump or ridge feeling should slowly go away.  In general, clear or pink watery drainage can be normal at first as your incision heals, but should decrease over time.    Q:  How do I know if my incision is infected?  A:  Look at your incision for signs of infection, like redness around the incision spreading to surrounding skin, or drainage of cloudy or foul-smelling drainage.  If you feel warm, check your temperature to see if you are running a fever.    **If any of these things occur, please notify the nurse at our office.  We may need you to come into the office for an incision check.      Q:  How do I take care of my incision?  A:  If you have a dressing in place - Starting the day after surgery, replace the dressing 1-2 times a day until there is no further drainage from the incision.  At that time, a dressing is no longer needed.  Try to minimize tape on the skin if irritation is occurring at the tape sites.  If you have significant irritation from tape on the skin, please call the  office to discuss other method of dressing your incision.    Small pieces of tape called  steri-strips  may be present directly overlying your incision; these may be removed 10 days after surgery unless otherwise specified by your surgeon.  If these tapes start to loosen at the ends, you may trim them back until they fall off or are removed.    A:  If you had  Dermabond  tissue glue used as a dressing (this causes your incision to look shiny with a clear covering over it) - This type of dressing wears off with time and does not require more dressings over the top unless it is draining around the glue as it wears off.  Do not apply ointments or lotions over the incisions until the glue has completely worn off.    Q:  There is a piece of tape or a sticky  lead  still on my skin.  Can I remove this?  A:  Sometimes the sticky  leads  used for monitoring during surgery or for evaluation in the emergency department are not all removed while you are in the hospital.  These sometimes have a tab or metal dot on them.  You can easily remove these on your own, like taking off a band-aid.  If there is a gel substance under the  lead , simply wipe/clean it off with a washcloth or paper towel.      Q:  What can I do to minimize constipation (very hard stools, or lack of stools)?  A:  Stay well hydrated.  Increase your dietary fiber intake or take a fiber supplement -with plenty of water.  Walk around frequently.  You may consider an over-the-counter stool-softener.  Your Pharmacist can assist you with choosing one that is stocked at your pharmacy.  Constipation is also one of the most common side effects of pain medication.  If you are using pain medication, be pro-active and try to PREVENT problems with constipation by taking the steps above BEFORE constipation becomes a problem.    Q:  What do I do if I need more pain medications?  A:  Call the office to receive refills.  Be aware that certain pain meds cannot be called into a  pharmacy and actually require a paper prescription.  A change may be made in your pain med as you progress thru your recovery period or if you have side effects to certain meds.    --Pain meds are NOT refilled after 5pm on weekdays, and NOT AT ALL on the weekends, so please look ahead to prevent problems.      Q:  Why am I having a hard time sleeping now that I am at home?  A:  Many medications you receive while you are in the hospital can impact your sleep for a number of days after your surgery/hospitalization.  Decreased level of activity and naps during the day may also make sleeping at night difficult.  Try to minimize day-time naps, and get up frequently during the day to walk around your home during your recovery time.  Sleep aides may be of some help, but are not recommended for long-term use.      Q:  I am having some back discomfort.  What should I do?  A:  This may be related to certain positioning that was required for your surgery, extended periods of time in bed, or other changes in your overall activity level.  You may try ice, heat, acetaminophen, or ibuprofen to treat this temporarily.  Note that many pain medications have acetaminophen in them and would state this on the prescription bottle.  Be sure not to exceed the maximum of 4000mg per day of acetaminophen.     **If the pain you are having does not resolve, is severe, or is a flare of back pain you have had on other occasions prior to surgery, please contact your primary physician for further recommendations or for an appointment to be examined at their office.    Q:  Why am I having headaches?  A:  Headaches can be caused by many things:  caffeine withdrawal, use of pain meds, dehydration, high blood pressure, lack of sleep, over-activity/exhaustion, flare-up of usual migraine headaches.  If you feel this is related to muscle tension (a band-like feeling around the head, or a pressure at the low-back of the head) you may try ice or heat to  this area.  You may need to drink more fluids (try electrolyte drink like Gatorade), rest, or take your usual migraine medications.   **If your headaches do not resolve, worsen, are accompanied by other symptoms, or if your blood pressure is high, please call your primary physician for recommendation and/or examination.    Q:  I am unable to urinate.  What do I do?  A:  A small percentage of people can have difficulty urinating initially after surgery.  This includes being able to urinate only a very small amount at a time and feeling discomfort or pressure in the very low abdomen.  This is called  urinary retention , and is actually an urgent situation.  Proceed to your nearest Emergency department for evaluation (not an Urgent Care Center).  Sometimes the bladder does not work correctly after certain medications you receive during surgery, or related to certain procedures.  You may need to have a catheter placed until your bladder recovers.  When planning to go to an Emergency department, it may help to call the ER to let them know you are coming in for this problem after a surgery.  This may help you get in quicker to be evaluated.  **If you have symptoms of a urinary tract infection, please contact your primary physician for the proper evaluation and treatment.          If you have other questions, please call the office Monday thru Friday between 8am and 5pm to discuss with the nurse or physician assistant.  [phone]    There is a surgeon ON CALL on weekday evenings and over the weekend in case of urgent need only, and may be contacted at the same number.    If you are having an emergency, call 911 or proceed to your nearest emergency department.        GENERAL ANESTHESIA OR SEDATION ADULT DISCHARGE INSTRUCTIONS   SPECIAL PRECAUTIONS FOR 24 HOURS AFTER SURGERY    IT IS NOT UNUSUAL TO FEEL LIGHT-HEADED OR FAINT, UP TO 24 HOURS AFTER SURGERY OR WHILE TAKING PAIN MEDICATION.  IF YOU HAVE THESE SYMPTOMS; SIT  FOR A FEW MINUTES BEFORE STANDING AND HAVE SOMEONE ASSIST YOU WHEN YOU GET UP TO WALK OR USE THE BATHROOM.    YOU SHOULD REST AND RELAX FOR THE NEXT 24 HOURS AND YOU MUST MAKE ARRANGEMENTS TO HAVE SOMEONE STAY WITH YOU FOR AT LEAST 24 HOURS AFTER YOUR DISCHARGE.  AVOID HAZARDOUS AND STRENUOUS ACTIVITIES.  DO NOT MAKE IMPORTANT DECISIONS FOR 24 HOURS.    DO NOT DRIVE ANY VEHICLE OR OPERATE MECHANICAL EQUIPMENT FOR 24 HOURS FOLLOWING THE END OF YOUR SURGERY.  EVEN THOUGH YOU MAY FEEL NORMAL, YOUR REACTIONS MAY BE AFFECTED BY THE MEDICATION YOU HAVE RECEIVED.    DO NOT DRINK ALCOHOLIC BEVERAGES FOR 24 HOURS FOLLOWING YOUR SURGERY.    DRINK CLEAR LIQUIDS (APPLE JUICE, GINGER ALE, 7-UP, BROTH, ETC.).  PROGRESS TO YOUR REGULAR DIET AS YOU FEEL ABLE.    YOU MAY HAVE A DRY MOUTH, A SORE THROAT, MUSCLES ACHES OR TROUBLE SLEEPING.  THESE SHOULD GO AWAY AFTER 24 HOURS.    CALL YOUR DOCTOR FOR ANY OF THE FOLLOWING:  SIGNS OF INFECTION (FEVER, GROWING TENDERNESS AT THE SURGERY SITE, A LARGE AMOUNT OF DRAINAGE OR BLEEDING, SEVERE PAIN, FOUL-SMELLING DRAINAGE, REDNESS OR SWELLING.    IT HAS BEEN OVER 8 TO 10 HOURS SINCE SURGERY AND YOU ARE STILL NOT ABLE TO URINATE (PASS WATER).

## 2019-04-28 NOTE — OP NOTE
SURGEON: Wendy Nickerson MD   ASSISTANT: Yanna Momin PA-C the physician assistant was medically necessary to assist in prepping, positioning, camera operation, retraction/exposure and closure of the port site.   PREOPERATIVE DIAGNOSIS: Acute Cholecystitis.   POSTOPERATIVE DIAGNOSIS:Acute Cholecystitis. and Chronic Cholecysititis  PROCEDURE: Laparoscopic cholecystectomy.  PREOPERATIVE MEDICATIONS: Ancef 2 gm   INDICATION:  Jacqueline Cifuentes is a 38 year old female who has acute cholecystitis.  A laparoscopic cholecystectomy is recommended and the risks and benefits of the procedure, including the risk of bleeding, infection, bile leak or bile duct injury,  have been discussed with the patient.  She agrees with proceeding.  PROCEDURE: The patient was placed supine. Abdomen prepped and draped in the usual sterile fashion.  Initial trocar site was in the supraumbilical location, and the abdomen was entered using Zack trocar technique. A pneumoperitoneum was established, and the camera was positioned within the peritoneal cavity. The remaining trocars were then placed under direct vision, two 5 mm at the right anterior axillary line, and a 5 mm at the subxiphoid. The gallbladder had tense distension and edema.  The gallbladder was aspirated for 80 ml of clear hydrops fluid.   The gallbladder was grasped and placed under traction using the two lateral sites.  The dissection began using  the subxiphoid port. The cystic duct was freed circumferentially .  After confirmation of the anatomy by dissecting the Hepato-cystic triangle, the cystic duct was triply clipped and divided.  The cystic artery was directly posterior and was triply clipped and divided both anterior and posterior branches. The gallbladder was then removed from the gallbladder bed using coag cautery. Once the remaining attachments were divided, the gallbladder was extracted from the supraumbilical site without leakage of bile or stones. The camera was  then repositioned and the bed inspected. The bed was dry and the clips in good position without leakage of blood or bile.   The  trocar sites were infiltrated with 0.25% Marcaine for pain control and removed under direct vision. The supraumbilical site was closed with interrupted 0 Vicryl for the fascia, and all skin sites closed with 4-0 subcuticular Monocryl. The patient transferred to recovery in good condition.   ESTIMATED BLOOD LOSS: 20 cc.   INTRAOPERATIVE FINDINGS: Acute Cholecystitis. and Chronic Cholecysititis

## 2019-04-28 NOTE — PROGRESS NOTES
PRIMARY DIAGNOSIS: BILIARY COLIC/UNCOMPLICATED EARLY ACUTE CHOLECYSTITIS  OUTPATIENT/OBSERVATION GOALS TO BE MET BEFORE DISCHARGE:     1. Pain status: Improved-controlled with oral pain medications.  2. Stable vital signs and labs (if performed) at disposition: Yes  3. Tolerating adequate PO diet: Patient NPO   4. Successful cholecystectomy or clear follow up plan with General Surgery team if immediate surgery not performed Sugery scheduled at 1030 this morning    5. ADLs back to baseline?  No  6. Activity and level of assistance: Up with standby assistance.  7. Barriers to discharge noted Yes surgery today at 1030      Discharge Planner Nurse   Safe discharge environment identified: Yes  Barriers to discharge: Yes       Entered by: Fabienne Calles 04/28/2019 8:30 AM  Please review provider order for any additional goals.   Nurse to notify provider when observation goals have been met and patient is ready for discharge.     Patient is alert and oriented x4. Patient Swazi speaking and daughter present at beside.  here now. VS WNL and documented. Patient states she has not voided since she got up to observation. Patient is SL and NPO. Patient states LBM was early this morning. Abdominal pain being controlled with Oxycodone. Patient is a SBA when up. Will take patient down to pre op at 0845.     /57 (BP Location: Left arm)   Pulse 68   Temp 97.5  F (36.4  C) (Oral)   Resp 16   Wt 94.8 kg (209 lb)   SpO2 93%   BMI 32.73 kg/m

## 2019-04-30 LAB — COPATH REPORT: NORMAL

## 2019-05-09 ENCOUNTER — OFFICE VISIT (OUTPATIENT)
Dept: SURGERY | Facility: CLINIC | Age: 39
End: 2019-05-09
Payer: COMMERCIAL

## 2019-05-09 VITALS
HEART RATE: 65 BPM | SYSTOLIC BLOOD PRESSURE: 108 MMHG | HEIGHT: 64 IN | RESPIRATION RATE: 16 BRPM | OXYGEN SATURATION: 100 % | DIASTOLIC BLOOD PRESSURE: 68 MMHG | WEIGHT: 150 LBS | BODY MASS INDEX: 25.61 KG/M2

## 2019-05-09 DIAGNOSIS — Z09 SURGICAL FOLLOWUP VISIT: Primary | ICD-10-CM

## 2019-05-09 PROCEDURE — 99024 POSTOP FOLLOW-UP VISIT: CPT | Performed by: PHYSICIAN ASSISTANT

## 2019-05-09 ASSESSMENT — MIFFLIN-ST. JEOR: SCORE: 1340.4

## 2019-05-09 NOTE — LETTER
May 9, 2019    RE:  Jacqueline Cifuentes                                                                                                                                                       [id number] [facility] [id number]    To whom it may concern:    Jacqueline Cifuentes is under my professional care for recent surgery and recovery.    Please allow return to work without weight restrictions on 5/15 limiting her work day to 6-8 hours maximum per day for her first 3 days back to work.    Sincerely,        Yanna Momin PA-C

## 2019-05-09 NOTE — PROGRESS NOTES
[facility]   **seen along with  and her 2 daughters  Subjective:  Jacqueline Cifuentes is here for her first postoperative visit.  She underwent Laparoscopic Cholecystectomy by Dr. Nickerson on April/28.  Pathology revealed: chronic cholecystitis, +stones.  She is now 11 days postop.  Today she is feeling well, eating well, having normal bowel movements and denies incision healing concerns.  She still feels sore at the umbilical site with certain movements.  Her recent medications include: none.      She is following activity restrictions, but wonders when she can return to work.  She had taken a work note in to her job which specified being off work for one week, then returning to work with a 20 lb weight restriction for 2 weeks, but her job would not allow her to return with any restrictions.  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  She is also worried about a box hitting her around the site of the umbilical incision.  We discussed this in detail, including use of an abdominal binder for her first week back as an additional protection from anything that might contact that area of her abdomen.  Medication and ice may also be used for comfort as needed.     Additional findings on CT in ED:  none.      Objective:  Abd - soft, non-tender, non-distended  Incs - Steri-strips in place, healing well, no erythema/bruising, +normal healing ridges/density, no seroma/hematoma noted, no hernia noted    Assessment:  S/p Laparoscopic Cholecystectomy; unremarkable recovery.    Plan:  Jacqueline may continue to slowly advance her activities at this time.  General recommendation is to remain at a 30 lb weight restriction until 3 weeks after surgery.  After that time, she may increase activity as tolerated.  We discussed her work in  detail and determined that she will return to work without restriction on 5/15 limiting her work day to 6-8 hours for her first 3 days back, to ensure she is tolerating this well.  Work note: work note written.    She may continue to utilize OTC pain management options as well as use of ice/heat to sites for comfort.  She should expect progressive resolution of the healing ridge along the incisional sites, normalizing bowel function, and improvement of food intolerances over the following 2-3 months.    Pt is recommended to contact the office if worsening pain, onset of fever/redness at any inc site, or new drainage from the area.  Pt also recommended to call office at any time if ongoing questions/concerns during recovery, but otherwise may follow-up on a prn basis.  Jacqueline is in agreement with this plan.    Yanna Momin PA-C      Please route or send letter to:  PCP

## 2022-09-28 ENCOUNTER — HOSPITAL ENCOUNTER (EMERGENCY)
Facility: CLINIC | Age: 42
End: 2022-09-28
Payer: COMMERCIAL

## (undated) DEVICE — Device

## (undated) DEVICE — GLOVE PROTEXIS BLUE W/NEU-THERA 6.5  2D73EB65

## (undated) DEVICE — ESU GROUND PAD ADULT W/CORD E7507

## (undated) DEVICE — PREP POVIDONE IODINE SOLUTION 10% 4OZ

## (undated) DEVICE — ENDO TROCAR SLEEVE KII Z-THREADED 05X100MM CTS02

## (undated) DEVICE — GLOVE PROTEXIS BLUE W/NEU-THERA 7.0  2D73EB70

## (undated) DEVICE — BLADE CLIPPER SGL USE 9680

## (undated) DEVICE — SU MONOCRYL 4-0 PS-2 27" UND Y426H

## (undated) DEVICE — SU VICRYL 0 CT-2 CR 8X18" J727D

## (undated) DEVICE — SOL NACL 0.9% IRRIG 1000ML BOTTLE 2F7124

## (undated) DEVICE — ENDO TROCAR OPTICAL ACCESS KII Z-THRD 12X100MM C0R85

## (undated) DEVICE — GLOVE PROTEXIS POWDER FREE 6.5 ORTHOPEDIC 2D73ET65

## (undated) DEVICE — BAG CLEAR TRASH 1.3M 39X33" P4040C

## (undated) DEVICE — SYR 50ML LL W/O NDL 309653

## (undated) DEVICE — PREP POVIDONE IODINE SCRUB 7.5% 4OZ APL82212

## (undated) DEVICE — ESU CORD MONOPOLAR 10'  E0510

## (undated) DEVICE — SOL NACL 0.9% IRRIG 3000ML BAG 2B7477

## (undated) DEVICE — PREP SKIN SCRUB TRAY 4461A

## (undated) DEVICE — ESU PENCIL W/HOLSTER E2350H

## (undated) DEVICE — LINEN POUCH DBL 5427

## (undated) DEVICE — ENDO TROCAR FIRST ENTRY KII FIOS Z-THRD 05X100MM CTF03

## (undated) DEVICE — CLIP APPLIER ENDO 5MM M/L LIGAMAX EL5ML

## (undated) DEVICE — LINEN FULL SHEET 5511

## (undated) DEVICE — LINEN HALF SHEET 5512

## (undated) DEVICE — LINEN TOWEL PACK X5 5464

## (undated) DEVICE — ESU ELEC BLADE 2.75" COATED/INSULATED E1455

## (undated) RX ORDER — BUPIVACAINE HYDROCHLORIDE 2.5 MG/ML
INJECTION, SOLUTION EPIDURAL; INFILTRATION; INTRACAUDAL
Status: DISPENSED
Start: 2019-04-28

## (undated) RX ORDER — PROPOFOL 10 MG/ML
INJECTION, EMULSION INTRAVENOUS
Status: DISPENSED
Start: 2019-04-28

## (undated) RX ORDER — HYDROCODONE BITARTRATE AND ACETAMINOPHEN 5; 325 MG/1; MG/1
TABLET ORAL
Status: DISPENSED
Start: 2019-04-28

## (undated) RX ORDER — CEFAZOLIN SODIUM 2 G/100ML
INJECTION, SOLUTION INTRAVENOUS
Status: DISPENSED
Start: 2019-04-28

## (undated) RX ORDER — LIDOCAINE HYDROCHLORIDE 10 MG/ML
INJECTION, SOLUTION EPIDURAL; INFILTRATION; INTRACAUDAL; PERINEURAL
Status: DISPENSED
Start: 2019-04-28

## (undated) RX ORDER — ONDANSETRON 2 MG/ML
INJECTION INTRAMUSCULAR; INTRAVENOUS
Status: DISPENSED
Start: 2019-04-28

## (undated) RX ORDER — DEXAMETHASONE SODIUM PHOSPHATE 4 MG/ML
INJECTION, SOLUTION INTRA-ARTICULAR; INTRALESIONAL; INTRAMUSCULAR; INTRAVENOUS; SOFT TISSUE
Status: DISPENSED
Start: 2019-04-28

## (undated) RX ORDER — GLYCOPYRROLATE 0.2 MG/ML
INJECTION INTRAMUSCULAR; INTRAVENOUS
Status: DISPENSED
Start: 2019-04-28

## (undated) RX ORDER — FENTANYL CITRATE 50 UG/ML
INJECTION, SOLUTION INTRAMUSCULAR; INTRAVENOUS
Status: DISPENSED
Start: 2019-04-28